# Patient Record
Sex: MALE | Race: OTHER | Employment: FULL TIME | URBAN - NONMETROPOLITAN AREA
[De-identification: names, ages, dates, MRNs, and addresses within clinical notes are randomized per-mention and may not be internally consistent; named-entity substitution may affect disease eponyms.]

---

## 2017-12-08 ENCOUNTER — HOSPITAL ENCOUNTER (EMERGENCY)
Age: 22
Discharge: HOME | End: 2017-12-08

## 2017-12-08 VITALS
RESPIRATION RATE: 16 BRPM | SYSTOLIC BLOOD PRESSURE: 140 MMHG | HEIGHT: 73 IN | DIASTOLIC BLOOD PRESSURE: 73 MMHG | HEART RATE: 95 BPM | WEIGHT: 215 LBS | OXYGEN SATURATION: 98 % | BODY MASS INDEX: 28.49 KG/M2 | TEMPERATURE: 98.4 F

## 2017-12-08 DIAGNOSIS — J01.00 ACUTE NON-RECURRENT MAXILLARY SINUSITIS: ICD-10-CM

## 2017-12-08 DIAGNOSIS — J40 BRONCHITIS: Primary | ICD-10-CM

## 2017-12-08 PROCEDURE — 99203 OFFICE O/P NEW LOW 30 MIN: CPT | Performed by: NURSE PRACTITIONER

## 2017-12-08 PROCEDURE — 99212 OFFICE O/P EST SF 10 MIN: CPT

## 2017-12-08 RX ORDER — METHYLPREDNISOLONE 4 MG/1
TABLET ORAL
Qty: 1 KIT | Refills: 0 | Status: SHIPPED | OUTPATIENT
Start: 2017-12-08 | End: 2017-12-14

## 2017-12-08 RX ORDER — AMOXICILLIN AND CLAVULANATE POTASSIUM 875; 125 MG/1; MG/1
1 TABLET, FILM COATED ORAL 2 TIMES DAILY WITH MEALS
Qty: 20 TABLET | Refills: 0 | Status: SHIPPED | OUTPATIENT
Start: 2017-12-08 | End: 2017-12-18

## 2017-12-08 ASSESSMENT — ENCOUNTER SYMPTOMS
SINUS PRESSURE: 1
ABDOMINAL DISTENTION: 0
STRIDOR: 0
CONSTIPATION: 0
DIARRHEA: 0
SINUS PAIN: 1
CHEST TIGHTNESS: 0
SHORTNESS OF BREATH: 0
BACK PAIN: 0
WHEEZING: 1
NAUSEA: 0
COUGH: 1
COLOR CHANGE: 0
VOMITING: 0
SORE THROAT: 1
ABDOMINAL PAIN: 0

## 2017-12-08 NOTE — ED PROVIDER NOTES
History reviewed. No pertinent past medical history. SURGICAL HISTORY     Patient  has a past surgical history that includes Appendectomy. CURRENT MEDICATIONS       Previous Medications    No medications on file       ALLERGIES     Patient is has No Known Allergies. FAMILY HISTORY     Patient's family history is not on file. SOCIAL HISTORY     Patient  reports that he has never smoked. He does not have any smokeless tobacco history on file. He reports that he does not drink alcohol. PHYSICAL EXAM     ED TRIAGE VITALS  BP: (!) 140/73, Temp: 98.4 °F (36.9 °C), Pulse: 95, Resp: 16, SpO2: 98 %  Physical Exam   Constitutional: He is oriented to person, place, and time. He appears well-developed and well-nourished. HENT:   Head: Normocephalic. Right Ear: Tympanic membrane and external ear normal.   Left Ear: Tympanic membrane and external ear normal.   Nose: Right sinus exhibits maxillary sinus tenderness. Right sinus exhibits no frontal sinus tenderness. Left sinus exhibits maxillary sinus tenderness. Left sinus exhibits no frontal sinus tenderness. Mouth/Throat: Uvula is midline, oropharynx is clear and moist and mucous membranes are normal.   Eyes: Pupils are equal, round, and reactive to light. Neck: Trachea normal and normal range of motion. Neck supple. No JVD present. Carotid bruit is not present. No thyromegaly present. Cardiovascular: Normal rate, regular rhythm, normal heart sounds and intact distal pulses. No murmur heard. Pulmonary/Chest: Effort normal. No respiratory distress. He has no decreased breath sounds. He has wheezes. He has no rhonchi. He has no rales. He exhibits no tenderness. Abdominal: Soft. Normal appearance and bowel sounds are normal. He exhibits no distension and no mass. There is no hepatosplenomegaly. There is no tenderness. There is no CVA tenderness. Musculoskeletal: Normal range of motion. He exhibits no edema, tenderness or deformity. Lymphadenopathy:     He has no cervical adenopathy. Neurological: He is alert and oriented to person, place, and time. No cranial nerve deficit or sensory deficit. He exhibits normal muscle tone. Coordination and gait normal.   Skin: Skin is warm and dry. No rash noted. No erythema. Psychiatric: He has a normal mood and affect. His behavior is normal. Judgment and thought content normal.   Nursing note and vitals reviewed. DIAGNOSTIC RESULTS   Labs:No results found for this visit on 12/08/17. IMAGING:    URGENT CARE COURSE:     Vitals:    12/08/17 1340   BP: (!) 140/73   Pulse: 95   Resp: 16   Temp: 98.4 °F (36.9 °C)   TempSrc: Oral   SpO2: 98%   Weight: 215 lb (97.5 kg)   Height: 6' 1\" (1.854 m)       Medications - No data to display  PROCEDURES:  None  FINAL IMPRESSION      1. Bronchitis    2. Acute non-recurrent maxillary sinusitis        DISPOSITION/PLAN   DISPOSITION Decision to Discharge   The patient was evaluated and was discharged in stable condition. He had no signs of pneumonia. He does have sinusitis and bronchitis. I did give him a Medrol Dosepak and Augmentin. He drink plenty of fluids and use Tylenol over-the-counter for any discomfort. He is good ER for any shortness of breath or return. PATIENT REFERRED TO:  No follow-up provider specified. DISCHARGE MEDICATIONS:  New Prescriptions    AMOXICILLIN-CLAVULANATE (AUGMENTIN) 875-125 MG PER TABLET    Take 1 tablet by mouth 2 times daily (with meals) for 10 days    METHYLPREDNISOLONE (MEDROL, KATELIN,) 4 MG TABLET    Take with food.      Current Discharge Medication List          Twan Fuentes, [address] [facility] [place], CNP  12/08/17 3343

## 2017-12-08 NOTE — ED TRIAGE NOTES
Jhony Alcocer arrives ambulatory by self  to room with complaint of URI. Symptoms started 2  weeks ago. Jhony Alcocer has a  Productive cough withunsure of color of sputum.

## 2024-03-11 ENCOUNTER — HOSPITAL ENCOUNTER (EMERGENCY)
Age: 29
Discharge: HOME | End: 2024-03-11

## 2024-03-11 VITALS
DIASTOLIC BLOOD PRESSURE: 84 MMHG | RESPIRATION RATE: 16 BRPM | OXYGEN SATURATION: 98 % | TEMPERATURE: 100.7 F | HEIGHT: 71 IN | SYSTOLIC BLOOD PRESSURE: 119 MMHG | HEART RATE: 117 BPM | BODY MASS INDEX: 30.8 KG/M2 | WEIGHT: 220 LBS

## 2024-03-11 DIAGNOSIS — K13.70 ORAL MUCOSAL LESION: Primary | ICD-10-CM

## 2024-03-11 DIAGNOSIS — J03.90 ACUTE TONSILLITIS, UNSPECIFIED ETIOLOGY: ICD-10-CM

## 2024-03-11 DIAGNOSIS — K13.0 LIP LESION: ICD-10-CM

## 2024-03-11 LAB
FLUAV AG SPEC QL: NEGATIVE
FLUBV AG SPEC QL: NEGATIVE
S PYO AG THROAT QL: NEGATIVE
SARS-COV-2 RDRP RESP QL NAA+PROBE: NOT  DETECTED

## 2024-03-11 PROCEDURE — 87140 CULTURE TYPE IMMUNOFLUORESC: CPT

## 2024-03-11 PROCEDURE — 87804 INFLUENZA ASSAY W/OPTIC: CPT

## 2024-03-11 PROCEDURE — 87253 VIRUS INOCULATE TISSUE ADDL: CPT

## 2024-03-11 PROCEDURE — 87635 SARS-COV-2 COVID-19 AMP PRB: CPT

## 2024-03-11 PROCEDURE — 87070 CULTURE OTHR SPECIMN AEROBIC: CPT

## 2024-03-11 PROCEDURE — 87252 VIRUS INOCULATION TISSUE: CPT

## 2024-03-11 PROCEDURE — 87651 STREP A DNA AMP PROBE: CPT

## 2024-03-11 PROCEDURE — 99214 OFFICE O/P EST MOD 30 MIN: CPT

## 2024-03-11 PROCEDURE — 99202 OFFICE O/P NEW SF 15 MIN: CPT

## 2024-03-11 RX ORDER — LIDOCAINE HYDROCHLORIDE 20 MG/ML
10 SOLUTION OROPHARYNGEAL PRN
Qty: 100 ML | Refills: 0 | Status: SHIPPED | OUTPATIENT
Start: 2024-03-11

## 2024-03-11 RX ORDER — VALACYCLOVIR HYDROCHLORIDE 1 G/1
2000 TABLET, FILM COATED ORAL 2 TIMES DAILY
Qty: 4 TABLET | Refills: 0 | Status: SHIPPED | OUTPATIENT
Start: 2024-03-11 | End: 2024-03-12

## 2024-03-11 ASSESSMENT — PAIN - FUNCTIONAL ASSESSMENT: PAIN_FUNCTIONAL_ASSESSMENT: NONE - DENIES PAIN

## 2024-03-11 NOTE — DISCHARGE INSTRUCTIONS
Take antiviral medications as prescribed, 2 tablets by mouth every 12 hours x 2.  Okay to use lidocaine swish and spit to help minimize symptoms.    Culture results should be available and 2-3 days, you may receive a call and ongoing treatment if indicated per your results.    Okay to treat fevers with Tylenol/Motrin in alternating fashion, please hydrate well keeping urine clear/pale yellow while taking his medications.    I hope you are feeling better soon!

## 2024-03-11 NOTE — ED PROVIDER NOTES
[facility] URGENT CARE  Urgent Care Encounter     CHIEF COMPLAINT       Chief Complaint   Patient presents with    Fever     101 \" feeling better today\"    Letter for School/Work    Pharyngitis    Blister     Upper and lower lip     HISTORY OF PRESENT ILLNESS   Dominick Landrum is a 28 y.o. male who presents to urgent care for blisters on his lips/mouth and fevers.  Symptoms started 4 days ago on Thursday, denies history of these or known sick contacts aside from his brother who had diagnosed with flu he was encountered over a week ago.  Denies history of blisters but states he had a history of cold sores but states these are different.  Overall not feeling well he attributes to a tough break-up, dating call for 7 years and wonder if she may have left and because she had encountered something like herpes.  Attempted to treat sores with Abreva on his lips which he stated made them more dry.  Denies nausea vomiting, admits to fever/chills, highest temperature at home was 102.  Denies chest pains or shortness of breath.  Denies chronic medical conditions or daily medications.  Admits to decreased appetite he again attributes to his recent tough break-up.  Denies high risk sexual encounters or sexual acts exchange for goods or money.    History obtained from patient  URGENT CARE TIMELINE      ED Course as of 03/11/24 1447   Mon Mar 11, 2024   1405 Strep Screen Group A Throat:    Rapid Strep A Screen NEGATIVE  Negative strep throat. [JR]   1405 Pulse(!): 117  Tachycardia, mild fever [JR]   1437 COVID-19, Rapid:    SARS-CoV-2, CHEMA NOT  DETECTED  Negative COVID. [JR]   1441 Rapid influenza A/B antigens:    Flu A Antigen Negative   Flu B Antigen Negative  Negative influenza. [JR]      ED Course User Index  [JR] Tanner Hernandez, APRN - CNP     PAST MEDICAL HISTORY   History reviewed. No pertinent past medical history.  SURGICALHISTORY     Patient  has a past surgical history that includes      DISPOSITION/ PLAN   PATIENT REFERRED TO:  No primary care provider on file.  No primary physician on file.  DISCHARGE MEDICATIONS:  New Prescriptions    LIDOCAINE VISCOUS HCL (XYLOCAINE) 2 % SOLN SOLUTION    Take 10 mLs by mouth as needed for Irritation or Pain    VALACYCLOVIR (VALTREX) 1 G TABLET    Take 2 tablets by mouth 2 times daily for 1 day     Discontinued Medications    No medications on file     Current Discharge Medication List        Tanner Hernandez, DESHAWN - CNP    (Please note that portions of this note were completed with a voice recognition program. Efforts were made to edit the dictations but occasionally words are mis-transcribed.)            Tanner Hernandez, APRN - CNP  03/11/24 0933

## 2024-03-11 NOTE — DISCHARGE INSTR - COC
Continuity of Care Form    Patient Name: Dominick Landrum   :  1995  MRN:  [id number]    Admit date:  3/11/2024  Discharge date:  ***    Code Status Order: No Order   Advance Directives:     Admitting Physician:  No admitting provider for patient encounter.  PCP: No primary care provider on file.    Discharging Nurse: ***  Discharging Hospital Unit/Room#:   Discharging Unit Phone Number: ***    Emergency Contact:   Extended Emergency Contact Information  Primary Emergency Contact: Batool Landrum  Address: [address] [place] [address] [place] [address] [facility]  Home Phone: [phone]  Relation: Parent    Past Surgical History:  Past Surgical History:   Procedure Laterality Date    APPENDECTOMY         Immunization History:     There is no immunization history on file for this patient.    Active Problems:  There is no problem list on file for this patient.      Isolation/Infection:   Isolation            No Isolation          Patient Infection Status       None to display                     Nurse Assessment:  Last Vital Signs: /84   Pulse (!) 117   Temp (!) 100.7 °F (38.2 °C)   Resp 16   Ht 1.803 m (5' 11\")   Wt 99.8 kg (220 lb)   SpO2 98%   BMI 30.68 kg/m²     Last documented pain score (0-10 scale):    Last Weight:   Wt Readings from Last 1 Encounters:   24 99.8 kg (220 lb)     Mental Status:  {IP PT MENTAL STATUS:}    IV Access:  { CHIP IV ACCESS:025544154}    Nursing Mobility/ADLs:  Walking   {CHP DME ADLs:064354278}  Transfer  {CHP DME ADLs:420928879}  Bathing  {CHP DME ADLs:720539912}  Dressing  {CHP DME ADLs:639829560}  Toileting  {CHP DME ADLs:614422155}  Feeding  {CHP DME ADLs:249906966}  Med Admin  {P DME ADLs:777702543}  Med Delivery   { CHIP MED Delivery:307126628}    Wound Care Documentation and Therapy:        Elimination:  Continence:   Bowel: {YES / NO:}  Bladder: {YES / NO:}  Urinary Catheter: {Urinary Catheter:094511642}  Diallo  is necessary for the continuing treatment of the diagnosis listed and that he requires {Admit to Appropriate Level of Care:29008} for {GREATER/LESS:549261076} 30 days.     Update Admission H&P: {CHP DME Changes in HandP:305925000}    PHYSICIAN SIGNATURE:  {Esignature:162566993}

## 2024-03-13 LAB — BACTERIA THROAT AEROBE CULT: NORMAL

## 2024-03-14 LAB — HSV SPEC CULT: NORMAL

## 2024-03-19 LAB
HSV SPEC CULT: NORMAL
HSV1 ISLT QL IF: NORMAL